# Patient Record
Sex: MALE
[De-identification: names, ages, dates, MRNs, and addresses within clinical notes are randomized per-mention and may not be internally consistent; named-entity substitution may affect disease eponyms.]

---

## 2017-10-05 ENCOUNTER — HOSPITAL ENCOUNTER (EMERGENCY)
Dept: HOSPITAL 31 - C.ER | Age: 57
Discharge: HOME | End: 2017-10-05
Payer: COMMERCIAL

## 2017-10-05 VITALS
DIASTOLIC BLOOD PRESSURE: 84 MMHG | HEART RATE: 78 BPM | RESPIRATION RATE: 18 BRPM | SYSTOLIC BLOOD PRESSURE: 139 MMHG | OXYGEN SATURATION: 97 % | TEMPERATURE: 98.5 F
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
## 2017-10-05 DIAGNOSIS — T16.1XXA: Primary | ICD-10-CM
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
## 2017-10-05 NOTE — C.PDOC
History Of Present Illness


58 yo male c/o Qtip in right ear since this morning. Pt notes he took a shower, 

was cleaning his ears and notes the cotton fell off. Pt was seen by the PMD who 

was not able to remove it therefore he came in. No pain . No fever. 


Time Seen by Provider: 10/05/17 14:10


Chief Complaint (Nursing): ENT Problem


History Per: Patient


History/Exam Limitations: None


Onset/Duration Of Symptoms: Hrs


Current Symptoms Are (Timing): Still Present





Past Medical History


Vital Signs: 


 Last Vital Signs











Temp  98.5 F   10/05/17 13:45


 


Pulse  78   10/05/17 13:45


 


Resp  18   10/05/17 13:45


 


BP  139/84   10/05/17 13:45


 


Pulse Ox  97   10/05/17 14:28














- Medical History


PMH: HTN


Family History: States: Unknown Family Hx





- Social History


Hx Alcohol Use: Yes


Hx Substance Use: No





- Immunization History


Hx Tetanus Toxoid Vaccination: No


Hx Influenza Vaccination: No


Hx Pneumococcal Vaccination: No





Review Of Systems


Except As Marked, All Systems Reviewed And Found Negative.


Constitutional: Negative for: Fever


ENT: Negative for: Ear Pain, Ear Discharge





Physical Exam





- Physical Exam


Appears: Well, Non-toxic, No Acute Distress


Skin: Normal Color, Warm, Dry


Head: Atraumatic, Normacephalic


Eye(s): bilateral: Normal Inspection, EOMI


Ear(s): Left: Normal, Right: Other ((+) FB)


Nose: Normal


Oral Mucosa: Moist


Neck: Normal, Normal ROM, Supple


Chest: Symmetrical


Respiratory: No Accessory Muscle Use


Neurological/Psych: Oriented x3, Normal Speech, Normal Cognition





ED Course And Treatment


O2 Sat by Pulse Oximetry: 97


Progress Note: FB was removed with alligator forceps.  TM visualized, no 

ertyhema, no trauma, no exudates or discharge.





Disposition





- Disposition


Referrals: 


Behin,Babak, MD [Staff Provider] - 


Disposition: HOME/ ROUTINE


Disposition Time: 14:26


Condition: STABLE


Instructions:  Ear Foreign Body (ED)


Forms:  [facility] (English)





- Clinical Impression


Clinical Impression: 


 Ear foreign body

## 2017-11-28 ENCOUNTER — HOSPITAL ENCOUNTER (INPATIENT)
Dept: HOSPITAL 31 - C.ER | Age: 57
LOS: 3 days | Discharge: HOME | DRG: 183 | End: 2017-12-01
Attending: INTERNAL MEDICINE | Admitting: INTERNAL MEDICINE
Payer: COMMERCIAL

## 2017-11-28 VITALS — RESPIRATION RATE: 20 BRPM

## 2017-11-28 DIAGNOSIS — I10: ICD-10-CM

## 2017-11-28 DIAGNOSIS — K57.32: Primary | ICD-10-CM

## 2017-11-28 DIAGNOSIS — E78.00: ICD-10-CM

## 2017-11-28 DIAGNOSIS — K76.0: ICD-10-CM

## 2017-11-28 LAB
ALBUMIN/GLOB SERPL: 1 {RATIO} (ref 1–2.1)
ALP SERPL-CCNC: 98 U/L (ref 38–126)
ALT SERPL-CCNC: 46 U/L (ref 21–72)
APTT BLD: 29 SECONDS (ref 21–34)
AST SERPL-CCNC: 37 U/L (ref 17–59)
BACTERIA #/AREA URNS HPF: (no result) /[HPF]
BASOPHILS # BLD AUTO: 0.1 K/UL (ref 0–0.2)
BASOPHILS NFR BLD: 0.4 % (ref 0–2)
BILIRUB SERPL-MCNC: 1.2 MG/DL (ref 0.2–1.3)
BILIRUB UR-MCNC: (no result) MG/DL
BUN SERPL-MCNC: 17 MG/DL (ref 9–20)
CALCIUM SERPL-MCNC: 9 MG/DL (ref 8.6–10.4)
CHLORIDE SERPL-SCNC: 100 MMOL/L (ref 98–107)
CO2 SERPL-SCNC: 25 MMOL/L (ref 22–30)
EOSINOPHIL # BLD AUTO: 0 K/UL (ref 0–0.7)
EOSINOPHIL NFR BLD: 0.3 % (ref 0–4)
ERYTHROCYTE [DISTWIDTH] IN BLOOD BY AUTOMATED COUNT: 12.3 % (ref 11.5–14.5)
GLOBULIN SER-MCNC: 4.5 GM/DL (ref 2.2–3.9)
GLUCOSE SERPL-MCNC: 92 MG/DL (ref 75–110)
GLUCOSE UR STRIP-MCNC: NORMAL MG/DL
HCT VFR BLD CALC: 48.5 % (ref 35–51)
INR PPP: 1
KETONES UR STRIP-MCNC: NEGATIVE MG/DL
LEUKOCYTE ESTERASE UR-ACNC: (no result) LEU/UL
LYMPHOCYTES # BLD AUTO: 1.9 K/UL (ref 1–4.3)
LYMPHOCYTES NFR BLD AUTO: 10.2 % (ref 20–40)
MCH RBC QN AUTO: 31.3 PG (ref 27–31)
MCHC RBC AUTO-ENTMCNC: 34.5 G/DL (ref 33–37)
MCV RBC AUTO: 90.9 FL (ref 80–94)
MONOCYTES # BLD: 1.4 K/UL (ref 0–0.8)
MONOCYTES NFR BLD: 7.4 % (ref 0–10)
NRBC BLD AUTO-RTO: 0 % (ref 0–2)
PH UR STRIP: 5 [PH] (ref 5–8)
PLATELET # BLD: 278 K/UL (ref 130–400)
PMV BLD AUTO: 8.1 FL (ref 7.2–11.7)
POTASSIUM SERPL-SCNC: 3.5 MMOL/L (ref 3.6–5.2)
PROT SERPL-MCNC: 9.1 G/DL (ref 6.3–8.3)
PROT UR STRIP-MCNC: (no result) MG/DL
RBC # UR STRIP: (no result) /UL
RBC #/AREA URNS HPF: 7 /HPF (ref 0–3)
SODIUM SERPL-SCNC: 138 MMOL/L (ref 132–148)
SP GR UR STRIP: > 1.06 (ref 1–1.03)
UROBILINOGEN UR-MCNC: NORMAL MG/DL (ref 0.2–1)
WBC # BLD AUTO: 18.3 K/UL (ref 4.8–10.8)
WBC #/AREA URNS HPF: 2 /HPF (ref 0–5)

## 2017-11-28 RX ADMIN — METRONIDAZOLE SCH MLS/HR: 500 INJECTION, SOLUTION INTRAVENOUS at 21:46

## 2017-11-28 RX ADMIN — POTASSIUM CHLORIDE SCH MLS/HR: 2 INJECTION, SOLUTION, CONCENTRATE INTRAVENOUS at 23:00

## 2017-11-28 NOTE — CP.PCM.HP
History of Present Illness





- History of Present Illness


History of Present Illness: 





56 y/o male, with PMHx of HTN, presents to ED c/o abdominal pain and reports 

feeling bloated. Pt reports having sonogram done yesterday which shows right 

kidney cyst. Denies n/v/d, or fever.


IN ER WBC WAS 15K AND CT ABD SHOWED DIVERTICULITIS 





Present on Admission





- Present on Admission


Any Indicators Present on Admission: No





Review of Systems





- Constitutional


Constitutional: absent: As Per HPI, Anorexia, Chills, Daytime Sleepiness, 

Excessive Sweating, Fatigue, Fever, Frequent Falls, Headache, Increased Appetite

, Lethargy, Malaise, Night Sweats, Snoring, Sleep Apnea, Weight Gain, Weight 

Loss, Weakness, Other





- EENT


Eyes: absent: As Per HPI, Blind Spots, Blurred Vision, Change in Vision, 

Decreased Night Vision, Diplopia, Discharge, Dry Eye, Exophthalmos, Floaters, 

Irritation, Itchy Eyes, Loss of Peripheral Vision, Pain, Photophobia, Requires 

Corrective Lenses, Sees Flashes, Spots in Vision, Tunnel Vision, Other Visual 

Disturbances, Loss of Vision, Other


Nose/Mouth/Throat: absent: As Per HPI, Epistaxis, Nasal Congestion, Nasal 

Discharge, Nasal Obstruction, Nasal Trauma, Nose Pain, Post Nasal Drip, Sinus 

Pain, Sinus Pressure, Bleeding Gums, Change in Voice, Dental Pain, Dry Mouth, 

Dysphagia, Halitosis, Hoarsness, Lip Swelling, Mouth Lesions, Mouth Pain, 

Odynophagia, Sore Throat, Throat Swelling, Tongue Swelling, Facial Pain, Neck 

Pain, Neck Mass, Other





- Cardiovascular


Cardiovascular: absent: As Per HPI, Acrocyanosis, Chest Pain, Chest Pain at Rest

, Chest Pain with Activity, Claudication, Diaphoresis, Dyspnea, Dyspnea on 

Exertion, Edema, Irregular Heart Rhythm, Pain Radiating to Arm/Neck/Jaw, Leg 

Edema, Leg Ulcers, Lightheadedness, Orthopnea, Palpitations, Paroxysmal 

Nocturnal Dyspnea, Pedal Edema, Radiating Pain, Rapid Heart Rate, Slow Heart 

Rate, Syncope, Other





- Respiratory


Respiratory: absent: As Per HPI, Cough, Dyspnea, Hemoptysis, Dyspnea on Exertion

, Wheezing, Snoring, Stridor, Pain on Inspiration, Chest Congestion, Excessive 

Mucous Production, Change in Mucous Color, Pain with Coughing, Other





- Gastrointestinal


Gastrointestinal: Abdominal Pain, Bloating, Vomiting.  absent: Coffee Ground 

Emesis, Hematemesis





- Genitourinary


Genitourinary: absent: As Per HPI, Change in Urinary Stream, Difficulty 

Urinating, Dysuria, Flank Pain, Hematuria, Pyuria, Nocturia, Urinary 

Incontinence, Urinary Frequency, Urinary Hesitance, Urinary Urgency, Voiding 

Freq/Small Amts, Freq UTI, Hx Renal/Bladder Calculi, Hx /Renal Surgery, 

Bladder Distension, Other





- Musculoskeletal


Musculoskeletal: absent: As Per HPI, Abnormal Gait, Arthralgias, Atrophy, Back 

Pain, Deformity, Joint Swelling, Limited Range of Motion, Loss of Height, 

Muscle Cramps, Muscle Weakness, Myalgias, Neck Pain, Numbness, Radiating Pain 

into Limb, Stiffness, Tingling, Other





- Neurological


Neurological: absent: As Per HPI, Abnormal Gait, Abnormal Hearing, Abnormal 

Movements, Abnormal Speech, Behavioral Changes, Burning Sensations, Confusion, 

Convulsions, Disequilibrium, Dizziness, Numbness, Focal Weakness, Frequent Falls

, Headaches, Lack of Coordination, Loss of Vision, Memory Loss, Paresthesias, 

Radicular Pain, Restless Legs, Sensory Deficit, Syncope, Tingling, Tremor, 

Vertigo, Weakness, Other Visual Disturbances, Other





Past Patient History





- Past Social History


Smoking Status: Never Smoked





- CARDIAC


Hx Hypercholesterolemia: Yes


Hx Hypertension: Yes





- HEENT


Other/Comment: contact lenses





- PSYCHIATRIC


Hx Substance Use: No





- SURGICAL HISTORY


Hx Surgeries: Yes


Other/Comment: hemorrhoid repair





- ANESTHESIA


Hx Anesthesia: Yes


Hx Anesthesia Reactions: No


Hx Malignant Hyperthermia: No





Meds


Allergies/Adverse Reactions: 


 Allergies











Allergy/AdvReac Type Severity Reaction Status Date / Time


 


No Known Allergies Allergy   Verified 10/05/17 14:01














Physical Exam





- Constitutional


Appears: Well





- Head Exam


Head Exam: ATRAUMATIC, NORMAL INSPECTION, NORMOCEPHALIC





- Eye Exam


Eye Exam: EOMI, Normal appearance, PERRL





- ENT Exam


ENT Exam: Mucous Membranes Moist, Normal Exam





- Neck Exam


Neck exam: Positive for: Normal Inspection





- Respiratory Exam


Respiratory Exam: Clear to Auscultation Bilateral, NORMAL BREATHING PATTERN





- Cardiovascular Exam


Cardiovascular Exam: REGULAR RHYTHM





- GI/Abdominal Exam


GI & Abdominal Exam: Distended, Firm, Tenderness.  absent: Rebound





- Extremities Exam


Extremities exam: Positive for: normal inspection





- Neurological Exam


Neurological exam: Alert, CN II-XII Intact, Normal Gait, Oriented x3, Reflexes 

Normal





- Psychiatric Exam


Psychiatric exam: Normal Affect, Normal Mood





Results





- Vital Signs


Recent Vital Signs: 





 Last Vital Signs











Temp  98.9 F   11/28/17 19:29


 


Pulse  81   11/28/17 19:29


 


Resp  20   11/28/17 19:29


 


BP  164/89 H  11/28/17 19:29


 


Pulse Ox  95   11/28/17 19:29














- Labs


Result Diagrams: 


 11/28/17 13:39





 11/28/17 13:39


Labs: 





 Laboratory Results - last 24 hr











  11/28/17 11/28/17 11/28/17





  13:39 13:39 13:39


 


WBC  18.3 H  


 


RBC  5.33  


 


Hgb  16.7  


 


Hct  48.5  


 


MCV  90.9  


 


MCH  31.3 H  


 


MCHC  34.5  


 


RDW  12.3  


 


Plt Count  278  


 


MPV  8.1  


 


Neut % (Auto)  81.7 H  


 


Lymph % (Auto)  10.2 L  


 


Mono % (Auto)  7.4  


 


Eos % (Auto)  0.3  


 


Baso % (Auto)  0.4  


 


Neut #  14.9 H  


 


Lymph #  1.9  


 


Mono #  1.4 H  


 


Eos #  0.0  


 


Baso #  0.1  


 


PT   11.1 


 


INR   1.0 


 


APTT   29 


 


Sodium    138


 


Potassium    3.5 L


 


Chloride    100


 


Carbon Dioxide    25


 


Anion Gap    17


 


BUN    17


 


Creatinine    1.0


 


Est GFR ( Amer)    > 60


 


Est GFR (Non-Af Amer)    > 60


 


Random Glucose    92


 


Calcium    9.0


 


Total Bilirubin    1.2


 


AST    37


 


ALT    46


 


Alkaline Phosphatase    98


 


Total Protein    9.1 H


 


Albumin    4.6


 


Globulin    4.5 H


 


Albumin/Globulin Ratio    1.0


 


Lipase    36


 


Urine Color   


 


Urine Clarity   


 


Urine pH   


 


Ur Specific Gravity   


 


Urine Protein   


 


Urine Glucose (UA)   


 


Urine Ketones   


 


Urine Blood   


 


Urine Nitrate   


 


Urine Bilirubin   


 


Urine Urobilinogen   


 


Ur Leukocyte Esterase   


 


Urine WBC (Auto)   


 


Urine RBC (Auto)   


 


Ur Squamous Epith Cells   


 


Urine Bacteria   














  11/28/17





  17:07


 


WBC 


 


RBC 


 


Hgb 


 


Hct 


 


MCV 


 


MCH 


 


MCHC 


 


RDW 


 


Plt Count 


 


MPV 


 


Neut % (Auto) 


 


Lymph % (Auto) 


 


Mono % (Auto) 


 


Eos % (Auto) 


 


Baso % (Auto) 


 


Neut # 


 


Lymph # 


 


Mono # 


 


Eos # 


 


Baso # 


 


PT 


 


INR 


 


APTT 


 


Sodium 


 


Potassium 


 


Chloride 


 


Carbon Dioxide 


 


Anion Gap 


 


BUN 


 


Creatinine 


 


Est GFR ( Amer) 


 


Est GFR (Non-Af Amer) 


 


Random Glucose 


 


Calcium 


 


Total Bilirubin 


 


AST 


 


ALT 


 


Alkaline Phosphatase 


 


Total Protein 


 


Albumin 


 


Globulin 


 


Albumin/Globulin Ratio 


 


Lipase 


 


Urine Color  Sarah [place]


 


Urine Clarity  Clear


 


Urine pH  5.0


 


Ur Specific Gravity  > 1.060 H


 


Urine Protein  2+ H


 


Urine Glucose (UA)  Normal


 


Urine Ketones  Negative


 


Urine Blood  1+ H


 


Urine Nitrate  Negative


 


Urine Bilirubin  1+ H


 


Urine Urobilinogen  Normal


 


Ur Leukocyte Esterase  Neg


 


Urine WBC (Auto)  2


 


Urine RBC (Auto)  7 H


 


Ur Squamous Epith Cells  3


 


Urine Bacteria  Few H














Assessment & Plan


(1) Diverticulitis


Status: Acute   


Comment: IV AB

## 2017-11-28 NOTE — CT
PROCEDURE:  CT Abdomen and Pelvis with contrast



HISTORY:

Lower abdominal pain, leukocytosis



COMPARISON:

None.



TECHNIQUE:

CT scan of the abdomen and pelvis was performed after intravenous 

administration of contrast.  Oral contrast was not administered.  

Coronal and sagittal reformatted images were obtained. 



Contrast dose: 100 mL Visipaque 320



Radiation dose:



Total exam DLP = 1224.40 mGy-cm.



This CT exam was performed using one or more of the following dose 

reduction techniques: Automated exposure control, adjustment of the 

mA and/or kV according to patient size, and/or use of iterative 

reconstruction technique.



FINDINGS:



LOWER THORAX:

The lung bases are clear. 



LIVER:

The liver is normal in size and there is diffuse fatty infiltration. 

No gross lesion or ductal dilatation. 



GALLBLADDER AND BILE DUCTS:

There are no calcified gallstones. 



PANCREAS:

Normal in size with homogeneous enhancement. No gross lesion or 

ductal dilatation.



SPLEEN:

Normal in size without focal lesion. 



ADRENALS:

Mild thickening of the adrenal glands without discrete nodule. 



KIDNEYS AND URETERS:

Both kidneys are normal in size and there is homogeneous enhancement 

without hydronephrosis. There is a simple parapelvic cyst in the 

right upper pole and simple cysts in the left kidney with a exophytic 

cyst in the left upper pole. 



VASCULATURE:

No aortic aneurysm. 



BOWEL:

The small bowel loops are normal in caliber. There is left colonic 

diverticulosis.  There is moderate circumferential mural thickening 

in the sigmoid colon with significant pericolonic inflammatory 

changes.  There is no micro perforation or abscess.



APPENDIX:

Normal appendix. 



PERITONEUM:

No free fluid. No free air. 



LYMPH NODES:

No enlarged lymph nodes. 



BLADDER:

Decompressed. 



REPRODUCTIVE:

Unremarkable. 



BONES:

No acute fracture. Within normal limits for the patient's age. 



OTHER FINDINGS:

There are bilateral small fat containing inguinal hernias. There is a 

small sliding hiatal hernia. 



IMPRESSION:

1. Acute sigmoid diverticulitis. No micro perforation or abscess.  

Follow-up CT after medical management is recommended to ensure 

complete resolution.



2. Fatty liver.

## 2017-11-28 NOTE — C.PDOC
History Of Present Illness


58 y/o male, with PMHx of HTN, presents to ED c/o abdominal pain and reports 

feeling bloated. Pt reports having sonogram done yesterday which shows right 

kidney cyst. Denies n/v/d, or fever.





Time Seen by Provider: 11/28/17 13:03


Chief Complaint (Nursing): Abdominal Pain


History Per: Patient


History/Exam Limitations: no limitations


Current Symptoms Are (Timing): Still Present


Location Of Pain/Discomfort: Diffuse


Quality Of Discomfort: "Pain"





Past Medical History


Reviewed: Historical Data, Nursing Documentation, Vital Signs


Vital Signs: 


 Last Vital Signs











Temp  98.2 F   11/28/17 11:35


 


Pulse  100 H  11/28/17 11:35


 


Resp  18   11/28/17 11:35


 


BP  177/103 H  11/28/17 11:35


 


Pulse Ox  98   11/28/17 13:43














- Medical History


PMH: HTN, Hypercholesterolemia


Family History: States: Unknown Family Hx





- Social History


Hx Alcohol Use: Yes


Hx Substance Use: No





- Immunization History


Hx Tetanus Toxoid Vaccination: No


Hx Influenza Vaccination: No


Hx Pneumococcal Vaccination: No





Review Of Systems


Except As Marked, All Systems Reviewed And Found Negative.


Constitutional: Negative for: Fever, Chills


Gastrointestinal: Positive for: Abdominal Pain.  Negative for: Nausea, Vomiting

, Diarrhea, Constipation


Genitourinary: Negative for: Dysuria, Frequency, Hematuria


Musculoskeletal: Negative for: Back Pain





Physical Exam





- Physical Exam


Appears: Non-toxic, No Acute Distress


Skin: Normal Color, Warm, Dry


Head: Atraumatic, Normacephalic


Eye(s): bilateral: Normal Inspection


Oral Mucosa: Moist


Neck: Supple


Chest: Symmetrical


Cardiovascular: Rhythm Regular, No Murmur


Respiratory: Normal Breath Sounds, No Rales, No Rhonchi, No Wheezing


Gastrointestinal/Abdominal: Soft, Tenderness (mild non-focal), No Guarding, No 

Rebound


Back: No CVA Tenderness


Extremity: Normal ROM


Neurological/Psych: Oriented x3, Normal Speech





ED Course And Treatment





- Laboratory Results


Result Diagrams: 


 11/28/17 13:39





 11/28/17 13:39


O2 Sat by Pulse Oximetry: 98


Pulse Ox Interpretation: Normal





Medical Decision Making


Medical Decision Making: 


r/o colitis, diverticiluits, uti-Blood work, UA ordered and reviewed. Patient 

was given Protonix. 





616: ct shows divertiulitis, marked leukoctyosis, needs iv antibiotics, iv 

fluids. accepted by dr stringer








Disposition





- Disposition


Disposition: HOSPITALIZED


Disposition Time: 18:16


Condition: STABLE





- Clinical Impression


Clinical Impression: 


 Diverticulitis








- Scribe Statement


The provider has reviewed the documentation as recorded by the [place]


Mirna Denise





All medical record entries made by the Scribe were at my direction and 

personally dictated by me. I have reviewed the chart and agree that the record 

accurately reflects my personal performance of the history, physical exam, 

medical decision making, and the department course for this patient. I have 

also personally directed, reviewed, and agree with the discharge instructions 

and disposition.





Decision To Admit





- Pt Status Changed To:


Hospital Disposition Of: Inpatient





- Admit Certification


Admit to Inpatient:: After my assessment, the patient will require 

hospitalization for at least two midnights.  This is because of the severity of 

symptoms shown, intensity of services needed, and/or the medical risk in this 

patient being treated as an outpatient.





- InPatient:


Physician Admission Certification:: needs iv antibiotics, for diverticulits, 

persist pain, cannot tolerate po





- .


Bed Request Type: Telemetry


Admitting Physician: Bossman Stringer


Patient Diagnosis: 


 Diverticulitis

## 2017-11-29 LAB
ALBUMIN/GLOB SERPL: 1.2 {RATIO} (ref 1–2.1)
ALP SERPL-CCNC: 78 U/L (ref 38–126)
ALT SERPL-CCNC: 33 U/L (ref 21–72)
AST SERPL-CCNC: 27 U/L (ref 17–59)
BACTERIA #/AREA URNS HPF: (no result) /[HPF]
BASOPHILS # BLD AUTO: 0 K/UL (ref 0–0.2)
BASOPHILS NFR BLD: 0.3 % (ref 0–2)
BILIRUB SERPL-MCNC: 1.3 MG/DL (ref 0.2–1.3)
BILIRUB UR-MCNC: NEGATIVE MG/DL
BUN SERPL-MCNC: 14 MG/DL (ref 9–20)
CALCIUM SERPL-MCNC: 8.2 MG/DL (ref 8.6–10.4)
CHLORIDE SERPL-SCNC: 101 MMOL/L (ref 98–107)
CO2 SERPL-SCNC: 22 MMOL/L (ref 22–30)
EOSINOPHIL # BLD AUTO: 0.1 K/UL (ref 0–0.7)
EOSINOPHIL NFR BLD: 1.1 % (ref 0–4)
ERYTHROCYTE [DISTWIDTH] IN BLOOD BY AUTOMATED COUNT: 12.7 % (ref 11.5–14.5)
GLOBULIN SER-MCNC: 3.3 GM/DL (ref 2.2–3.9)
GLUCOSE SERPL-MCNC: 81 MG/DL (ref 75–110)
GLUCOSE UR STRIP-MCNC: NORMAL MG/DL
HCT VFR BLD CALC: 42.7 % (ref 35–51)
KETONES UR STRIP-MCNC: NEGATIVE MG/DL
LEUKOCYTE ESTERASE UR-ACNC: (no result) LEU/UL
LYMPHOCYTES # BLD AUTO: 2.7 K/UL (ref 1–4.3)
LYMPHOCYTES NFR BLD AUTO: 22.4 % (ref 20–40)
MCH RBC QN AUTO: 31.6 PG (ref 27–31)
MCHC RBC AUTO-ENTMCNC: 34.4 G/DL (ref 33–37)
MCV RBC AUTO: 92 FL (ref 80–94)
MONOCYTES # BLD: 0.9 K/UL (ref 0–0.8)
MONOCYTES NFR BLD: 7.6 % (ref 0–10)
NRBC BLD AUTO-RTO: 0.9 % (ref 0–2)
PH UR STRIP: 6 [PH] (ref 5–8)
PLATELET # BLD: 236 K/UL (ref 130–400)
PMV BLD AUTO: 8.9 FL (ref 7.2–11.7)
POTASSIUM SERPL-SCNC: 3.2 MMOL/L (ref 3.6–5.2)
PROT SERPL-MCNC: 7.1 G/DL (ref 6.3–8.3)
PROT UR STRIP-MCNC: (no result) MG/DL
RBC # UR STRIP: (no result) /UL
RBC #/AREA URNS HPF: 5 /HPF (ref 0–3)
SODIUM SERPL-SCNC: 135 MMOL/L (ref 132–148)
SP GR UR STRIP: 1.01 (ref 1–1.03)
UROBILINOGEN UR-MCNC: NORMAL MG/DL (ref 0.2–1)
WBC # BLD AUTO: 12.2 K/UL (ref 4.8–10.8)
WBC #/AREA URNS HPF: 1 /HPF (ref 0–5)

## 2017-11-29 RX ADMIN — POTASSIUM CHLORIDE SCH MLS/HR: 2 INJECTION, SOLUTION, CONCENTRATE INTRAVENOUS at 22:40

## 2017-11-29 RX ADMIN — METRONIDAZOLE SCH MLS/HR: 500 INJECTION, SOLUTION INTRAVENOUS at 05:00

## 2017-11-29 RX ADMIN — POTASSIUM CHLORIDE SCH MLS/HR: 2 INJECTION, SOLUTION, CONCENTRATE INTRAVENOUS at 11:02

## 2017-11-29 RX ADMIN — METRONIDAZOLE SCH MLS/HR: 500 INJECTION, SOLUTION INTRAVENOUS at 14:17

## 2017-11-29 RX ADMIN — WATER SCH MLS/HR: 1 INJECTION INTRAMUSCULAR; INTRAVENOUS; SUBCUTANEOUS at 21:00

## 2017-11-29 RX ADMIN — TAZOBACTAM SODIUM AND PIPERACILLIN SODIUM SCH MLS/HR: 375; 3 INJECTION, SOLUTION INTRAVENOUS at 18:00

## 2017-11-29 NOTE — CP.PCM.CON
History of Present Illness





- History of Present Illness


History of Present Illness: 


INFECTIOUS DISEASE CONSULT;


HPI;


58 y/o male, with PMHx of HTN, presents to ED c/o abdominal pain and reports 

feeling bloated. Pt reports having sonogram done yesterday which shows right 

kidney cyst. Denies n/v/d, or fever.


IN ER WBC WAS 18.3K AND CT ABD SHOWED SIGMOID DIVERTICULITIS.


Patient got 1 dose of Zosyn and then continued on ceftriaxone IV piggyback 

every 24 hourly.  IV Flagyl was also started on 250 every 8 hourly by the 

private [facility].


Patient continues to have abdominal pain left lower quadrant but denies any 

nausea vomiting.  Patient denies any history off melena or maroon-colored 

stools.


Patient remembers that a year ago he had a similar episode and was told he had 

colitis.


Patient denies any fever or chills.


INFECTIOUS DISEASE CONSULTATION REQUESTED BY DR ESPINOZA FOR ACUTE DIVERTICULITIS.








PMH: HTN, Hypercholesterolemia


Family History: States: Unknown Family Hx





- Social History


Hx Alcohol Use: Yes


Hx Substance Use: No





- Immunization History


Hx Tetanus Toxoid Vaccination: No


Hx Influenza Vaccination: No


Hx Pneumococcal Vaccination: No


ALLERGIES; NKA.





Review of Systems





- Constitutional


Constitutional: absent: Chills, Fever





- EENT


Eyes: absent: Change in Vision


Nose/Mouth/Throat: Dry Mouth.  absent: Mouth Lesions





- Cardiovascular


Cardiovascular: absent: Chest Pain, Dyspnea





- Respiratory


Respiratory: absent: Cough, Chest Congestion





- Gastrointestinal


Gastrointestinal: Abdominal Pain, Bloating.  absent: Change in Stool Character, 

Diarrhea, Nausea, Vomiting





- Genitourinary


Genitourinary: absent: Dysuria, Flank Pain, Hematuria, Pyuria, Freq UTI





- Neurological


Neurological: absent: Dizziness, Headaches, Weakness





- Hematologic/Lymphatic


Hematologic: As Per HPI.  absent: Easy Bleeding, Easy Bruising





Past Patient History





- Past Medical History & Family History


Past Medical History?: Yes





- Past Social History


Smoking Status: Never Smoked





- CARDIAC


Hx Cardiac Disorders: Yes


Hx Hypercholesterolemia: Yes


Hx Hypertension: Yes





- PULMONARY


Hx Respiratory Disorders: No





- NEUROLOGICAL


Hx Neurological Disorder: No





- HEENT


Other/Comment: contact lenses





- RENAL


Hx Chronic Kidney Disease: No





- ENDOCRINE/METABOLIC


Hx Endocrine Disorders: No





- HEMATOLOGICAL/ONCOLOGICAL


Hx Blood Disorders: No





- INTEGUMENTARY


Hx Dermatological Problems: No





- MUSCULOSKELETAL/RHEUMATOLOGICAL


Hx Falls: No





- GASTROINTESTINAL


Hx Gastrointestinal Disorders: No





- GENITOURINARY/GYNECOLOGICAL


Hx Genitourinary Disorders: No





- PSYCHIATRIC


Hx Substance Use: No





- SURGICAL HISTORY


Hx Surgeries: Yes


Other/Comment: hemorrhoid repair





- ANESTHESIA


Hx Anesthesia: Yes


Hx Anesthesia Reactions: No


Hx Malignant Hyperthermia: No


Has any member of the family had a problem w/ anesthesia?: No





Meds


Allergies/Adverse Reactions: 


 Allergies











Allergy/AdvReac Type Severity Reaction Status Date / Time


 


No Known Allergies Allergy   Verified 10/05/17 14:01














- Medications


Medications: 


 Current Medications





Acetaminophen (Tylenol 325mg Tab)  650 mg PO Q6 PRN


   PRN Reason: Pain, moderate (4-7)


   Last Admin: 11/29/17 16:39 Dose:  650 mg


Guaifenesin/Dextromethorphan (Robitussin Dm)  5 ml PO Q4H PRN


   PRN Reason: Cough


Heparin Sodium (Porcine) (Heparin)  5,000 units SC Q12 [facility]


   Last Admin: 11/29/17 09:25 Dose:  5,000 units


Potassium Chloride 40 meq/ (Dextrose/Sodium Chloride)  1,020 mls @ 60 mls/hr IV 

.Q17H [facility]


Metronidazole (Flagyl)  500 mg in 100 mls @ 100 mls/hr IVPB Q8H [facility]


Piperacillin Sod/Tazobactam Sod (Zosyn 3.375 Gm Iv Premix)  3.375 gm in 50 mls 

@ 100 mls/hr IVPB Q8H [facility]


Lisinopril (Zestril)  40 mg PO DAILY [facility]


   Last Admin: 11/29/17 09:24 Dose:  40 mg


Metoprolol Tartrate (Lopressor)  50 mg PO BID [facility]


   Last Admin: 11/29/17 18:44 Dose:  50 mg


Pantoprazole Sodium (Protonix Inj)  40 mg IVP DAILY [facility]


   Last Admin: 11/29/17 11:00 Dose:  40 mg











Physical Exam





- Constitutional


Appears: No Acute Distress





- Head Exam


Head Exam: NORMAL INSPECTION





- Eye Exam


Eye Exam: EOMI, PERRL.  absent: Scleral icterus





- ENT Exam


ENT Exam: Normal Oropharynx





- Neck Exam


Neck exam: Positive for: Normal Inspection





- Respiratory Exam


Respiratory Exam: Clear to Auscultation Bilateral





- Cardiovascular Exam


Cardiovascular Exam: REGULAR RHYTHM, +S1, +S2





- GI/Abdominal Exam


GI & Abdominal Exam: Distended, Hypoactive Bowel Sounds, Organomegaly (

hepatomegaly.), Tenderness.  absent: Rigid





- Extremities Exam


Extremities exam: Positive for: normal capillary refill, pedal pulses present.  

Negative for: calf tenderness, pedal edema





- Back Exam


Back exam: absent: CVA tenderness (L), CVA tenderness (R)





- Neurological Exam


Neurological exam: Alert, CN II-XII Intact, Oriented x3, Reflexes Normal





- Psychiatric Exam


Psychiatric exam: Normal Mood





- Skin


Skin Exam: Normal Color, Warm





Results





- Vital Signs


Recent Vital Signs: 


 Last Vital Signs











Temp  99.2 F   11/29/17 15:00


 


Pulse  61   11/29/17 15:00


 


Resp  20   11/29/17 15:00


 


BP  148/94 H  11/29/17 19:15


 


Pulse Ox  95   11/29/17 15:00














- Labs


Result Diagrams: 


 11/29/17 06:55





 11/29/17 06:55


Labs: 


 Laboratory Results - last 24 hr











  11/29/17 11/29/17





  06:55 06:55


 


WBC  12.2 H 


 


RBC  4.64 


 


Hgb  14.7  D 


 


Hct  42.7 


 


MCV  92.0 


 


MCH  31.6 H 


 


MCHC  34.4 


 


RDW  12.7 


 


Plt Count  236 


 


MPV  8.9 


 


Neut % (Auto)  68.6 


 


Lymph % (Auto)  22.4 


 


Mono % (Auto)  7.6 


 


Eos % (Auto)  1.1 


 


Baso % (Auto)  0.3 


 


Neut #  8.4 H 


 


Lymph #  2.7 


 


Mono #  0.9 H 


 


Eos #  0.1 


 


Baso #  0.0 


 


Sodium   135


 


Potassium   3.2 L


 


Chloride   101


 


Carbon Dioxide   22


 


Anion Gap   15


 


BUN   14


 


Creatinine   0.8


 


Est GFR ( Amer)   > 60


 


Est GFR (Non-Af Amer)   > 60


 


Random Glucose   81


 


Calcium   8.2 L


 


Total Bilirubin   1.3


 


AST   27


 


ALT   33


 


Alkaline Phosphatase   78


 


Total Protein   7.1


 


Albumin   3.8


 


Globulin   3.3


 


Albumin/Globulin Ratio   1.2














- Imaging and Cardiology


  ** CT scan - abdomen


Status: Report reviewed by me (multiple cysts bilateral kidneys.  Sigmoid 

diverticulitis .see full report)





Assessment & Plan


(1) Acute diverticulitis of intestine


Assessment and Plan: 


pancultures.


ua/urine cultures.


Stools for occult blood.


cea levels


Start IV Zosyn 3.375 every 8 hourly 11/29/17.


Increase IV Flagyl to 500 mg IV piggyback every 8 hourly 11/29/17.


Patient presently on clear liquids and tolerating it well.


Patient asking for food.


Status: Acute   





(2) Abdominal pain


Assessment and Plan: 


abdominal pain most likely secondary to diverticulitis as localized in the left 

lower quadrant more than in the flanks.


Patient has bilateral kidney cysts questionable congenital.


Will get UA urine cultures.


Status: Acute   





(3) Hypertension


Assessment and Plan: 


blood pressure being adjusted by PMD andnp  ms brush.


Status: Acute   





(4) Fatty liver


Assessment and Plan: 


patient to avoid fatty foods once back on his regular diet.


Will also check for hepatitis screen.


Status: Acute   





(5) Hypercholesterolemia


Status: Acute

## 2017-11-29 NOTE — CP.PCM.PN
Subjective





- Date & Time of Evaluation


Date of Evaluation: 11/29/17


Time of Evaluation: 13:22





- Subjective


Subjective: 





CHIEF COMPLAINTS TODAY :


LESS ABD PAIN 





ROS.


HEENT :  N.


Resp :       No cough, wheezing ,pleuritic CP ,or hemoptysis 


Cardio :     No anginal  CP, PND, orthopnea, palpitation 


GI :           No n, n/v ,diarrhea or GI bleeding .


CNS : No headache, vertigo, focal deficit.


Musculoskel :  No joint swelling ,


Derm :        No rash 


Psych :     Normal affect.


Ext :  No  swelling ,calf pain 





PE.


Pt. is alert awake in no distress.


V.S  As noted in the chart 


Head ,ear nose,throat and eyes : Normal.


Neck : Supple with normal carotids.


Lungs: Clear air entry.


Heart : S1 & S2 normal with S4. No murmur.


Abd : Soft  tender with normal bowel sounds.


Neuro : Moves all ext. with no localized deficit.


Ext : No edema with intact pulses.Non tender calves 


Derm : No rashes or decubitus ulcer.





LABS/RADIOLOGY:





ASSESSMENT/PLAN : 


IV AB AS PER ID 











Objective





- Vital Signs/Intake and Output


Vital Signs (last 24 hours): 


 











Temp Pulse Resp BP Pulse Ox


 


 99.3 F   74   20   160/90 H  95 


 


 11/29/17 08:00  11/29/17 08:00  11/29/17 08:00  11/29/17 09:24  11/29/17 08:00








Intake and Output: 


 











 11/29/17 11/29/17





 11:59 23:59


 


Intake Total 880 


 


Balance 880 














- Medications


Medications: 


 Current Medications





Acetaminophen (Tylenol 325mg Tab)  650 mg PO Q6 PRN


   PRN Reason: Pain, moderate (4-7)


Heparin Sodium (Porcine) (Heparin)  5,000 units SC Q12 [facility]


   Last Admin: 11/29/17 09:25 Dose:  5,000 units


Metronidazole (Flagyl)  250 mg in 50 mls @ 100 mls/hr IVPB Q8H [facility]


   Stop: 12/03/17 22:01


   Last Admin: 11/29/17 05:00 Dose:  100 mls/hr


Ceftriaxone Sodium 1 gm/ (Dextrose)  50 mls @ 100 mls/hr IVPB Q12H [facility]


   Last Admin: 11/29/17 08:10 Dose:  100 mls/hr


Potassium Chloride 40 meq/ (Dextrose/Sodium Chloride)  1,020 mls @ 80 mls/hr IV 

.[id number] [facility]


   Last Admin: 11/29/17 11:02 Dose:  80 mls/hr


Lisinopril (Zestril)  40 mg PO DAILY [facility]


   Last Admin: 11/29/17 09:24 Dose:  40 mg


Metoprolol Tartrate (Lopressor)  25 mg PO BID [facility]


   Last Admin: 11/29/17 09:24 Dose:  25 mg


Pantoprazole Sodium (Protonix Inj)  40 mg IVP DAILY [facility]


   Last Admin: 11/29/17 11:00 Dose:  40 mg











- Labs


Labs: 


 





 11/29/17 06:55 





 11/29/17 06:55 





 











PT  11.1 SECONDS (9.7-12.2)   11/28/17  13:39    


 


INR  1.0   11/28/17  13:39    


 


APTT  29 SECONDS (21-34)   11/28/17  13:39    














Assessment and Plan


(1) Diverticulitis


Status: Acute

## 2017-11-30 LAB
ALBUMIN/GLOB SERPL: 0.9 {RATIO} (ref 1–2.1)
ALBUMIN/GLOB SERPL: 1 {RATIO} (ref 1–2.1)
ALP SERPL-CCNC: 78 U/L (ref 38–126)
ALP SERPL-CCNC: 78 U/L (ref 38–126)
ALT SERPL-CCNC: 42 U/L (ref 21–72)
ALT SERPL-CCNC: 43 U/L (ref 21–72)
AST SERPL-CCNC: 29 U/L (ref 17–59)
AST SERPL-CCNC: 30 U/L (ref 17–59)
BASOPHILS # BLD AUTO: 0 K/UL (ref 0–0.2)
BASOPHILS NFR BLD: 0.4 % (ref 0–2)
BILIRUB DIRECT SERPL-MCNC: 0.4 MG/DL (ref 0–0.4)
BILIRUB SERPL-MCNC: 0.9 MG/DL (ref 0.2–1.3)
BILIRUB SERPL-MCNC: 0.9 MG/DL (ref 0.2–1.3)
BUN SERPL-MCNC: 10 MG/DL (ref 9–20)
CALCIUM SERPL-MCNC: 8.6 MG/DL (ref 8.6–10.4)
CEA SERPL-MCNC: 1 NG/ML (ref 0–3)
CHLORIDE SERPL-SCNC: 101 MMOL/L (ref 98–107)
CO2 SERPL-SCNC: 25 MMOL/L (ref 22–30)
EOSINOPHIL # BLD AUTO: 0.2 K/UL (ref 0–0.7)
EOSINOPHIL NFR BLD: 2.6 % (ref 0–4)
ERYTHROCYTE [DISTWIDTH] IN BLOOD BY AUTOMATED COUNT: 12.7 % (ref 11.5–14.5)
GLOBULIN SER-MCNC: 4 GM/DL (ref 2.2–3.9)
GLOBULIN SER-MCNC: 4.4 GM/DL (ref 2.2–3.9)
GLUCOSE SERPL-MCNC: 73 MG/DL (ref 75–110)
HCT VFR BLD CALC: 43.3 % (ref 35–51)
LYMPHOCYTES # BLD AUTO: 2.8 K/UL (ref 1–4.3)
LYMPHOCYTES NFR BLD AUTO: 33.7 % (ref 20–40)
MCH RBC QN AUTO: 31.5 PG (ref 27–31)
MCHC RBC AUTO-ENTMCNC: 34.1 G/DL (ref 33–37)
MCV RBC AUTO: 92.3 FL (ref 80–94)
MONOCYTES # BLD: 0.7 K/UL (ref 0–0.8)
MONOCYTES NFR BLD: 8.4 % (ref 0–10)
NRBC BLD AUTO-RTO: 0 % (ref 0–2)
PLATELET # BLD: 257 K/UL (ref 130–400)
PMV BLD AUTO: 8.9 FL (ref 7.2–11.7)
POTASSIUM SERPL-SCNC: 3.4 MMOL/L (ref 3.6–5.2)
PROT SERPL-MCNC: 8 G/DL (ref 6.3–8.3)
PROT SERPL-MCNC: 8.5 G/DL (ref 6.3–8.3)
SODIUM SERPL-SCNC: 132 MMOL/L (ref 132–148)
WBC # BLD AUTO: 8.3 K/UL (ref 4.8–10.8)

## 2017-11-30 RX ADMIN — WATER SCH MLS/HR: 1 INJECTION INTRAMUSCULAR; INTRAVENOUS; SUBCUTANEOUS at 19:21

## 2017-11-30 RX ADMIN — TAZOBACTAM SODIUM AND PIPERACILLIN SODIUM SCH MLS/HR: 375; 3 INJECTION, SOLUTION INTRAVENOUS at 17:20

## 2017-11-30 RX ADMIN — WATER SCH MLS/HR: 1 INJECTION INTRAMUSCULAR; INTRAVENOUS; SUBCUTANEOUS at 04:00

## 2017-11-30 RX ADMIN — TAZOBACTAM SODIUM AND PIPERACILLIN SODIUM SCH MLS/HR: 375; 3 INJECTION, SOLUTION INTRAVENOUS at 10:21

## 2017-11-30 RX ADMIN — WATER SCH MLS/HR: 1 INJECTION INTRAMUSCULAR; INTRAVENOUS; SUBCUTANEOUS at 11:34

## 2017-11-30 RX ADMIN — TAZOBACTAM SODIUM AND PIPERACILLIN SODIUM SCH MLS/HR: 375; 3 INJECTION, SOLUTION INTRAVENOUS at 01:00

## 2017-11-30 RX ADMIN — POTASSIUM CHLORIDE SCH MLS/HR: 2 INJECTION, SOLUTION, CONCENTRATE INTRAVENOUS at 09:08

## 2017-11-30 NOTE — CP.PCM.PN
Subjective





- Date & Time of Evaluation


Date of Evaluation: 11/30/17


Time of Evaluation: 16:34





- Subjective


Subjective: 





AFEBRILE,


ABDOMINAL PAIN MUCH IMPROVED.


ANXIOUS TO GET SOLID FOOD.





lABS REVIEWED.





STOOLS NEGATIVE FOR OCCULT BLOOD.





Objective





- Vital Signs/Intake and Output


Vital Signs (last 24 hours): 


 











Temp Pulse Resp BP Pulse Ox


 


 98.3 F   57 L  20   138/88   97 


 


 11/30/17 08:21  11/30/17 11:00  11/30/17 08:21  11/30/17 11:00  11/30/17 06:30








Intake and Output: 


 











 11/30/17 11/30/17





 06:59 18:59


 


Intake Total 1470 870


 


Balance 1470 870














- Medications


Medications: 


 Current Medications





Acetaminophen (Tylenol 325mg Tab)  650 mg PO Q6 PRN


   PRN Reason: Pain, moderate (4-7)


   Last Admin: 11/30/17 15:56 Dose:  650 mg


Guaifenesin/Dextromethorphan (Robitussin Dm)  5 ml PO Q4H PRN


   PRN Reason: Cough


   Last Admin: 11/29/17 22:37 Dose:  5 ml


Heparin Sodium (Porcine) (Heparin)  5,000 units SC Q12 [facility]


   Last Admin: 11/30/17 09:01 Dose:  5,000 units


Potassium Chloride 40 meq/ (Dextrose/Sodium Chloride)  1,020 mls @ 60 mls/hr IV 

.Q17H [facility]


   Last Admin: 11/30/17 09:08 Dose:  60 mls/hr


Metronidazole (Flagyl)  500 mg in 100 mls @ 100 mls/hr IVPB Q8H [facility]


   Last Admin: 11/30/17 11:34 Dose:  100 mls/hr


Piperacillin Sod/Tazobactam Sod (Zosyn 3.375 Gm Iv Premix)  3.375 gm in 50 mls 

@ 100 mls/hr IVPB Q8H [facility]


   Last Admin: 11/30/17 10:21 Dose:  100 mls/hr


Lisinopril (Zestril)  40 mg PO DAILY [facility]


   Last Admin: 11/30/17 09:02 Dose:  Not Given


Metoprolol Tartrate (Lopressor)  50 mg PO BID [facility]


   Last Admin: 11/30/17 09:01 Dose:  50 mg


Pantoprazole Sodium (Protonix Inj)  40 mg IVP DAILY [facility]


   Last Admin: 11/30/17 09:01 Dose:  40 mg











- Labs


Labs: 


 





 11/30/17 08:18 





 11/30/17 08:18 





 











PT  11.1 SECONDS (9.7-12.2)   11/28/17  13:39    


 


INR  1.0   11/28/17  13:39    


 


APTT  29 SECONDS (21-34)   11/28/17  13:39    














- Constitutional


Appears: No Acute Distress





- Head Exam


Head Exam: NORMAL INSPECTION





- Eye Exam


Eye Exam: EOMI, PERRL





- ENT Exam


ENT Exam: Normal Oropharynx





- Neck Exam


Neck Exam: Normal Inspection





- Respiratory Exam


Respiratory Exam: NORMAL BREATHING PATTERN





- Cardiovascular Exam


Cardiovascular Exam: REGULAR RHYTHM, +S1, +S2





- GI/Abdominal Exam


GI & Abdominal Exam: Soft, Tenderness (MINIMAL ON DEEP PALPATION.), Normal 

Bowel Sounds





- Extremities Exam


Extremities Exam: absent: Calf Tenderness, Pedal Edema





- Neurological Exam


Neurological Exam: Awake, CN II-XII Intact, Oriented x3, Reflexes Normal





- Psychiatric Exam


Psychiatric exam: Normal Mood





- Skin


Skin Exam: Normal Color, Warm





Assessment and Plan


(1) Acute diverticulitis of intestine


Assessment & Plan: 


IMPROVING.


ADVANCING DIET AS PER pmd.


CONTINUE iv zOSYN 3.375 EVERY 8 HOURLY.


CONTINUE iv fLAGYL 500 EVERY 8 HOURLY.


BLOOD WORKS REVIEWED-wbc IMPROVED.


Status: Acute   





(2) Abdominal pain


Status: Acute   





(3) Hypertension


Status: Acute   





(4) Fatty liver


Assessment & Plan: 


LFTS -IMPROVED TO NORMAL.


HEPATITIS SCREEN NEGATIVE.


Status: Acute   





(5) Hypercholesterolemia


Status: Acute

## 2017-11-30 NOTE — RAD
HISTORY:

COUGH  



COMPARISON:

No prior.



TECHNIQUE:

Chest PA and lateral



FINDINGS:



LUNGS:

No active pulmonary disease.



PLEURA:

No significant pleural effusion identified. No pneumothorax apparent.



CARDIOVASCULAR:

Normal.



OSSEOUS STRUCTURES:

No significant abnormalities.



VISUALIZED UPPER ABDOMEN:

Normal.



OTHER FINDINGS:

None.



IMPRESSION:

No active disease.

## 2017-11-30 NOTE — CARD
--------------- APPROVED REPORT --------------





EKG Measurement

Heart [id number]

CA [id number]

[id number]

[id number]

[id number]



<Conclusion>

Normal sinus rhythm

T wave abnormality, consider lateral ischemia

Abnormal ECG

## 2017-11-30 NOTE — CP.PCM.PN
Subjective





- Date & Time of Evaluation


Date of Evaluation: 11/30/17


Time of Evaluation: 13:52





- Subjective


Subjective: 





NO N/V 


BP STABLE 


LESS ABD PAIN 


ADVANCE DIET 





Objective





- Vital Signs/Intake and Output


Vital Signs (last 24 hours): 


 











Temp Pulse Resp BP Pulse Ox


 


 98.3 F   57 L  20   138/88   97 


 


 11/30/17 08:21  11/30/17 11:00  11/30/17 08:21  11/30/17 11:00  11/30/17 06:30








Intake and Output: 


 











 11/30/17 11/30/17





 11:59 23:59


 


Intake Total 920 


 


Balance 920 














- Medications


Medications: 


 Current Medications





Acetaminophen (Tylenol 325mg Tab)  650 mg PO Q6 PRN


   PRN Reason: Pain, moderate (4-7)


   Last Admin: 11/30/17 06:25 Dose:  650 mg


Guaifenesin/Dextromethorphan (Robitussin Dm)  5 ml PO Q4H PRN


   PRN Reason: Cough


   Last Admin: 11/29/17 22:37 Dose:  5 ml


Heparin Sodium (Porcine) (Heparin)  5,000 units SC Q12 [facility]


   Last Admin: 11/30/17 09:01 Dose:  5,000 units


Potassium Chloride 40 meq/ (Dextrose/Sodium Chloride)  1,020 mls @ 60 mls/hr IV 

.Q17H [facility]


   Last Admin: 11/30/17 09:08 Dose:  60 mls/hr


Metronidazole (Flagyl)  500 mg in 100 mls @ 100 mls/hr IVPB Q8H [facility]


   Last Admin: 11/30/17 11:34 Dose:  100 mls/hr


Piperacillin Sod/Tazobactam Sod (Zosyn 3.375 Gm Iv Premix)  3.375 gm in 50 mls 

@ 100 mls/hr IVPB Q8H [facility]


   Last Admin: 11/30/17 10:21 Dose:  100 mls/hr


Lisinopril (Zestril)  40 mg PO DAILY [facility]


   Last Admin: 11/30/17 09:02 Dose:  Not Given


Metoprolol Tartrate (Lopressor)  50 mg PO BID [facility]


   Last Admin: 11/30/17 09:01 Dose:  50 mg


Pantoprazole Sodium (Protonix Inj)  40 mg IVP DAILY [facility]


   Last Admin: 11/30/17 09:01 Dose:  40 mg











- Labs


Labs: 


 





 11/30/17 08:18 





 11/30/17 08:18 





 











PT  11.1 SECONDS (9.7-12.2)   11/28/17  13:39    


 


INR  1.0   11/28/17  13:39    


 


APTT  29 SECONDS (21-34)   11/28/17  13:39    














Assessment and Plan


(1) Diverticulitis


Status: Acute

## 2017-12-01 VITALS
SYSTOLIC BLOOD PRESSURE: 165 MMHG | HEART RATE: 64 BPM | OXYGEN SATURATION: 96 % | TEMPERATURE: 98.7 F | DIASTOLIC BLOOD PRESSURE: 110 MMHG

## 2017-12-01 LAB
ALBUMIN/GLOB SERPL: 1.2 {RATIO} (ref 1–2.1)
ALP SERPL-CCNC: 76 U/L (ref 38–126)
ALT SERPL-CCNC: 41 U/L (ref 21–72)
AST SERPL-CCNC: 31 U/L (ref 17–59)
BASOPHILS # BLD AUTO: 0 K/UL (ref 0–0.2)
BASOPHILS NFR BLD: 0.4 % (ref 0–2)
BILIRUB SERPL-MCNC: 0.8 MG/DL (ref 0.2–1.3)
BUN SERPL-MCNC: 9 MG/DL (ref 9–20)
CALCIUM SERPL-MCNC: 8.5 MG/DL (ref 8.6–10.4)
CHLORIDE SERPL-SCNC: 106 MMOL/L (ref 98–107)
CO2 SERPL-SCNC: 25 MMOL/L (ref 22–30)
EOSINOPHIL # BLD AUTO: 0.2 K/UL (ref 0–0.7)
EOSINOPHIL NFR BLD: 2.5 % (ref 0–4)
ERYTHROCYTE [DISTWIDTH] IN BLOOD BY AUTOMATED COUNT: 12.6 % (ref 11.5–14.5)
GLOBULIN SER-MCNC: 3.2 GM/DL (ref 2.2–3.9)
GLUCOSE SERPL-MCNC: 100 MG/DL (ref 75–110)
HCT VFR BLD CALC: 41.9 % (ref 35–51)
LYMPHOCYTES # BLD AUTO: 1.7 K/UL (ref 1–4.3)
LYMPHOCYTES NFR BLD AUTO: 24.7 % (ref 20–40)
MCH RBC QN AUTO: 31.6 PG (ref 27–31)
MCHC RBC AUTO-ENTMCNC: 34.6 G/DL (ref 33–37)
MCV RBC AUTO: 91.5 FL (ref 80–94)
MONOCYTES # BLD: 0.5 K/UL (ref 0–0.8)
MONOCYTES NFR BLD: 7.7 % (ref 0–10)
NRBC BLD AUTO-RTO: 0.1 % (ref 0–2)
PLATELET # BLD: 253 K/UL (ref 130–400)
PMV BLD AUTO: 8.7 FL (ref 7.2–11.7)
POTASSIUM SERPL-SCNC: 3.6 MMOL/L (ref 3.6–5.2)
PROT SERPL-MCNC: 7.1 G/DL (ref 6.3–8.3)
SODIUM SERPL-SCNC: 141 MMOL/L (ref 132–148)
WBC # BLD AUTO: 6.8 K/UL (ref 4.8–10.8)

## 2017-12-01 RX ADMIN — POTASSIUM CHLORIDE SCH MLS/HR: 2 INJECTION, SOLUTION, CONCENTRATE INTRAVENOUS at 05:16

## 2017-12-01 RX ADMIN — WATER SCH MLS/HR: 1 INJECTION INTRAMUSCULAR; INTRAVENOUS; SUBCUTANEOUS at 04:00

## 2017-12-01 RX ADMIN — TAZOBACTAM SODIUM AND PIPERACILLIN SODIUM SCH MLS/HR: 375; 3 INJECTION, SOLUTION INTRAVENOUS at 10:00

## 2017-12-01 RX ADMIN — POTASSIUM CHLORIDE SCH: 2 INJECTION, SOLUTION, CONCENTRATE INTRAVENOUS at 02:22

## 2017-12-01 RX ADMIN — WATER SCH MLS/HR: 1 INJECTION INTRAMUSCULAR; INTRAVENOUS; SUBCUTANEOUS at 11:02

## 2017-12-01 RX ADMIN — TAZOBACTAM SODIUM AND PIPERACILLIN SODIUM SCH MLS/HR: 375; 3 INJECTION, SOLUTION INTRAVENOUS at 02:17

## 2017-12-01 NOTE — CP.PCM.PN
Subjective





- Date & Time of Evaluation


Date of Evaluation: 12/01/17


Time of Evaluation: 11:12





Objective





- Vital Signs/Intake and Output


Vital Signs (last 24 hours): 


 











Temp Pulse Resp BP Pulse Ox


 


 98.7 F   64   20   165/110 H  96 


 


 12/01/17 08:13  12/01/17 08:13  12/01/17 08:13  12/01/17 08:13  12/01/17 08:13








Intake and Output: 


 











 12/01/17 12/01/17





 06:59 18:59


 


Intake Total 250 720


 


Balance 250 720














- Medications


Medications: 


 Current Medications





Acetaminophen (Tylenol 325mg Tab)  650 mg PO Q6 PRN


   PRN Reason: Pain, moderate (4-7)


   Last Admin: 11/30/17 15:56 Dose:  650 mg


Aspirin (Aspirin)  325 mg PO DAILY [facility]


Guaifenesin/Dextromethorphan (Robitussin Dm)  5 ml PO Q4H PRN


   PRN Reason: Cough


   Last Admin: 11/29/17 22:37 Dose:  5 ml


Heparin Sodium (Porcine) (Heparin)  5,000 units SC Q12 [facility]


   Last Admin: 11/30/17 22:33 Dose:  5,000 units


Metronidazole (Flagyl)  500 mg in 100 mls @ 100 mls/hr IVPB Q8H [facility]


   Last Admin: 12/01/17 11:02 Dose:  100 mls/hr


Piperacillin Sod/Tazobactam Sod (Zosyn 3.375 Gm Iv Premix)  3.375 gm in 50 mls 

@ 100 mls/hr IVPB Q8H [facility]


   Last Admin: 12/01/17 10:00 Dose:  100 mls/hr


Lisinopril (Zestril)  40 mg PO DAILY [facility]


   Last Admin: 12/01/17 08:31 Dose:  40 mg


Metoprolol Tartrate (Lopressor)  50 mg PO BID [facility]


   Last Admin: 12/01/17 08:32 Dose:  50 mg


Pantoprazole Sodium (Protonix Inj)  40 mg IVP DAILY [facility]


   Last Admin: 11/30/17 09:01 Dose:  40 mg


Zolpidem Tartrate (Ambien)  10 mg PO HS PRN


   PRN Reason: Insomnia


   Last Admin: 11/30/17 22:59 Dose:  10 mg











- Labs


Labs: 


 





 12/01/17 06:57 





 12/01/17 06:57 





 











PT  11.1 SECONDS (9.7-12.2)   11/28/17  13:39    


 


INR  1.0   11/28/17  13:39    


 


APTT  29 SECONDS (21-34)   11/28/17  13:39    














Assessment and Plan


(1) Acute diverticulitis of intestine


Status: Acute   





(2) Abdominal pain


Status: Acute   





(3) Hypertension


Status: Acute   





(4) Fatty liver


Status: Acute   





(5) Hypercholesterolemia


Status: Acute

## 2017-12-01 NOTE — CP.PCM.PN
Subjective





- Date & Time of Evaluation


Date of Evaluation: 12/01/17


Time of Evaluation: 11:00





- Subjective


Subjective: 





Alert, awake, no sob, chest pain or abdominal pain, NAD.





Objective





- Vital Signs/Intake and Output


Vital Signs (last 24 hours): 


 











Temp Pulse Resp BP Pulse Ox


 


 98.7 F   64   20   165/110 H  96 


 


 12/01/17 08:13  12/01/17 08:13  12/01/17 08:13  12/01/17 08:13  12/01/17 08:13








Intake and Output: 


 











 12/01/17 12/01/17





 06:59 18:59


 


Intake Total 250 1450


 


Balance 250 1450














- Labs


Labs: 


 





 12/01/17 06:57 





 12/01/17 06:57 





 











PT  11.1 SECONDS (9.7-12.2)   11/28/17  13:39    


 


INR  1.0   11/28/17  13:39    


 


APTT  29 SECONDS (21-34)   11/28/17  13:39    














Assessment and Plan





- Assessment and Plan (Free Text)


Assessment: 





Patient is seen and examined. Alert and orientedx3, denies abdominal pain, 

tolerating regular diet. D/W DR Reynaga, DR Wynn, plan to discharge home today 

on cipro and flagyl and added metoprolol to the home meds for uncontrolled 

hypertension. Denies sob or chest pains. Advised to follow up with PMD in 1 

week.

## 2018-02-01 ENCOUNTER — HOSPITAL ENCOUNTER (EMERGENCY)
Dept: HOSPITAL 31 - C.ER | Age: 58
Discharge: HOME | End: 2018-02-01
Payer: MEDICAID

## 2018-02-01 VITALS
SYSTOLIC BLOOD PRESSURE: 155 MMHG | RESPIRATION RATE: 18 BRPM | TEMPERATURE: 99.2 F | DIASTOLIC BLOOD PRESSURE: 108 MMHG | OXYGEN SATURATION: 95 % | HEART RATE: 60 BPM

## 2018-02-01 VITALS — BODY MASS INDEX: 37.4 KG/M2

## 2018-02-01 DIAGNOSIS — H53.40: Primary | ICD-10-CM

## 2018-02-01 NOTE — C.PDOC
History Of Present Illness


57-YEAR-OLD MALE, PRESENTS TO THE EMERGENCY DEPARTMENT WITH COMPLAINTS OF 

INCREASING LEFT VISION CHANGE ONGOING X2 DAYS. PS INITIALLY NOTICED FLOATERS, 

THEN INCREASED REDNESS IN VISION ON LOWER WHITE PART OF LEFT EYE NOW INCREASING 

IN SIZE. PT NOTES LEFT SIDED TEMPORAL HA, NOW RESOLVED. PS HX OF RETINAL 

DETACHMENT WITH SURGERIES. NO PREVIOUS DIAGNOSIS OF RETINAL ABNORMALITY. DENIES 

EYE PAIN AT THIS TIME





EXAM


NAD, COMFORTABLE


R EYE NO GROSS VISUAL DEFICIT


LEFT EYE VISUAL FIELD LOSS 5 O CLOCK


NEURO INTACT


Time Seen by Provider: 02/01/18 14:14


Chief Complaint (Nursing): Eye Problem


History Per: Patient


History/Exam Limitations: no limitations


Onset/Duration Of Symptoms: Days


Current Symptoms Are (Timing): Still Present


Severity: Moderate





Past Medical History


Reviewed: Historical Data, Nursing Documentation, Vital Signs


Vital Signs: 


 Last Vital Signs











Temp  99.2 F   02/01/18 14:01


 


Pulse  60   02/01/18 14:01


 


Resp  18   02/01/18 14:01


 


BP  155/108 H  02/01/18 14:01


 


Pulse Ox  95   02/01/18 14:34














- Medical History


PMH: Diverticulitis (NOV. 2017), HTN, Hypercholesterolemia


   Denies: Colonic Polyps, Fractures, Chronic Kidney Disease, Sleep Apnea


Surgical History: 


   Denies: Endoscopy


Family History: States: No Known Family Hx





- Social History


Hx Alcohol Use: No


Hx Substance Use: No





- Immunization History


Hx Tetanus Toxoid Vaccination: No


Hx Influenza Vaccination: No


Hx Pneumococcal Vaccination: No





Review Of Systems


Constitutional: Negative for: Fever, Chills


Eyes: Positive for: Vision Change.  Negative for: Pain


Cardiovascular: Negative for: Chest Pain


Respiratory: Negative for: Shortness of Breath


Gastrointestinal: Negative for: Vomiting


Neurological: Positive for: Headache.  Negative for: Weakness, Numbness, 

Dizziness





Physical Exam





- Physical Exam


Appears: Non-toxic, No Acute Distress


Skin: Warm, Dry, No Rash


Head: Atraumatic, Normacephalic


Eye(s): right: Normal Inspection (NO GROSS VISUAL DEFICIT), left: Other (VISUAL 

FIELD LOSS 5 O CLOCK)


Nose: Normal


Oral Mucosa: Moist


Lips: Normal Appearing


Neck: Normal ROM


Chest: Symmetrical


Cardiovascular: Rhythm Regular, No Murmur


Respiratory: Normal Breath Sounds, No Accessory Muscle Use


Extremity: Normal ROM


Neurological/Psych: Oriented x3, Normal Speech, Normal Cognition, Normal 

Cranial Nerves, Normal Motor, Normal Sensation, Normal Reflexes, Other (NO 

FOCAL DEFICIT)





ED Course And Treatment


O2 Sat by Pulse Oximetry: 95





Progress





- Re-Evaluation


Re-evaluation Note: 





02/01/18 14:15


d/w DR Alem GARLAND ON CALL AWARE OF ER FINDINGS. RECOMMENDS TO REFER PT TO 

OFFICE IMMEDIATELY FOR EVALUATION.


02/01/18 14:31


PT ADVISED CONSULT AND RECOMMEND SX/PRESENTATION NOT CONSISTENT WITH ACUTE 

STROKE





PT OFFERED HEAD CT AND ADDITIONAL TESTING BUT REQUESTS TO DEFER AT THIS TIME 

AND WISHES TO SEE EYE SPECIALIST 





- Continuity of Care


Discussed pt. case with consultant/specialty: Opthalmology





Disposition


Counseled Patient/Family Regarding: Diagnosis, Need For Followup





- Disposition


Referrals: 


Jerson Ferrara MD [Staff Provider] - 


Disposition: HOME/ ROUTINE


Disposition Time: 14:23


Condition: GOOD


Additional Instructions: 


GO TO [facility] IMMEDIATELY. RETURN IF WORSENING SYMPTOMS.


Instructions:  Blurred Vision (ED)


Forms:  [facility] (English)





- Clinical Impression


Clinical Impression: 


 Visual field defect








- Scribe Statement


The provider has reviewed the documentation as recorded by the Scribe (TOSHIA ARREGUIN)








All medical record entries made by the Scribe were at my direction and 

personally dictated by me. I have reviewed the chart and agree that the record 

accurately reflects my personal performance of the history, physical exam, 

medical decision making, and the department course for this patient. I have 

also personally directed, reviewed, and agree with the discharge instructions 

and disposition.

## 2018-05-04 ENCOUNTER — HOSPITAL ENCOUNTER (OUTPATIENT)
Dept: HOSPITAL 31 - C.ENDO | Age: 58
Discharge: HOME | End: 2018-05-04
Attending: INTERNAL MEDICINE
Payer: MEDICAID

## 2018-05-04 VITALS — SYSTOLIC BLOOD PRESSURE: 144 MMHG | HEART RATE: 61 BPM | RESPIRATION RATE: 19 BRPM | DIASTOLIC BLOOD PRESSURE: 90 MMHG

## 2018-05-04 VITALS — TEMPERATURE: 98 F | OXYGEN SATURATION: 96 %

## 2018-05-04 VITALS — BODY MASS INDEX: 37.1 KG/M2

## 2018-05-04 DIAGNOSIS — K64.8: ICD-10-CM

## 2018-05-04 DIAGNOSIS — D12.0: ICD-10-CM

## 2018-05-04 DIAGNOSIS — K57.30: ICD-10-CM

## 2018-05-04 DIAGNOSIS — Z87.19: ICD-10-CM

## 2018-05-04 DIAGNOSIS — K29.70: ICD-10-CM

## 2018-05-04 DIAGNOSIS — D12.5: ICD-10-CM

## 2018-05-04 DIAGNOSIS — K21.9: Primary | ICD-10-CM

## 2018-05-04 PROCEDURE — 88305 TISSUE EXAM BY PATHOLOGIST: CPT

## 2018-05-04 PROCEDURE — 43239 EGD BIOPSY SINGLE/MULTIPLE: CPT

## 2018-05-04 PROCEDURE — 45380 COLONOSCOPY AND BIOPSY: CPT

## 2018-05-04 NOTE — CP.SDSHP
Same Day Surgery [facility] & P





- History


Proposed Procedure: egd/colonoscopy


Pre-Op Diagnosis: gerd.  diverticulitis





- Allergies


Allergies: 


Allergies





No Known Allergies Allergy (Verified 01/26/18 10:21)


 











- Physical Exam


General Appearance: nl


Vital Signs: 


 Vital Signs











  05/04/18





  06:50


 


Temperature 97.7 F


 


Pulse Rate 73


 


Respiratory 19





Rate 


 


Blood Pressure 140/89


 


O2 Sat by Pulse 98





Oximetry 











Mental Status: Alert & Oriented x3


Neuro: WNL


Heart: WNL


Lungs: WNL


GI: WNL





- {Optional Preform as Required}


Abdomen: WNL





- Impression


Impression: Gerd.  diverticulitis.  egd.  colon


Pt. Evaluated Today:Candidate for Anesthesia & Procedure: Yes





- Date & Time


Date: 05/04/18


Time: 07:57





Short Stay Discharge





- Short Stay Discharge


Admitting Diagnosis/Reason for Visit: GERD / HX OF DIVERICULITIS


Disposition: HOME/ ROUTINE